# Patient Record
Sex: MALE | Race: OTHER | HISPANIC OR LATINO | URBAN - METROPOLITAN AREA
[De-identification: names, ages, dates, MRNs, and addresses within clinical notes are randomized per-mention and may not be internally consistent; named-entity substitution may affect disease eponyms.]

---

## 2023-04-08 ENCOUNTER — EMERGENCY (EMERGENCY)
Facility: HOSPITAL | Age: 58
LOS: 1 days | Discharge: HOME | End: 2023-04-08
Attending: STUDENT IN AN ORGANIZED HEALTH CARE EDUCATION/TRAINING PROGRAM | Admitting: STUDENT IN AN ORGANIZED HEALTH CARE EDUCATION/TRAINING PROGRAM
Payer: COMMERCIAL

## 2023-04-08 VITALS
SYSTOLIC BLOOD PRESSURE: 162 MMHG | RESPIRATION RATE: 18 BRPM | OXYGEN SATURATION: 100 % | DIASTOLIC BLOOD PRESSURE: 101 MMHG | TEMPERATURE: 98 F | HEART RATE: 93 BPM

## 2023-04-08 VITALS
HEART RATE: 89 BPM | SYSTOLIC BLOOD PRESSURE: 144 MMHG | RESPIRATION RATE: 16 BRPM | DIASTOLIC BLOOD PRESSURE: 98 MMHG | OXYGEN SATURATION: 100 %

## 2023-04-08 LAB
ALBUMIN SERPL ELPH-MCNC: 4.5 G/DL — SIGNIFICANT CHANGE UP (ref 3.3–5)
ALP SERPL-CCNC: 83 U/L — SIGNIFICANT CHANGE UP (ref 40–120)
ALT FLD-CCNC: 51 U/L — HIGH (ref 4–41)
ANION GAP SERPL CALC-SCNC: 15 MMOL/L — HIGH (ref 7–14)
AST SERPL-CCNC: 32 U/L — SIGNIFICANT CHANGE UP (ref 4–40)
BASOPHILS # BLD AUTO: 0.04 K/UL — SIGNIFICANT CHANGE UP (ref 0–0.2)
BASOPHILS NFR BLD AUTO: 0.7 % — SIGNIFICANT CHANGE UP (ref 0–2)
BILIRUB SERPL-MCNC: 0.2 MG/DL — SIGNIFICANT CHANGE UP (ref 0.2–1.2)
BUN SERPL-MCNC: 15 MG/DL — SIGNIFICANT CHANGE UP (ref 7–23)
CALCIUM SERPL-MCNC: 9.5 MG/DL — SIGNIFICANT CHANGE UP (ref 8.4–10.5)
CHLORIDE SERPL-SCNC: 104 MMOL/L — SIGNIFICANT CHANGE UP (ref 98–107)
CO2 SERPL-SCNC: 20 MMOL/L — LOW (ref 22–31)
CREAT SERPL-MCNC: 1.04 MG/DL — SIGNIFICANT CHANGE UP (ref 0.5–1.3)
EGFR: 85 ML/MIN/1.73M2 — SIGNIFICANT CHANGE UP
EOSINOPHIL # BLD AUTO: 0.16 K/UL — SIGNIFICANT CHANGE UP (ref 0–0.5)
EOSINOPHIL NFR BLD AUTO: 3 % — SIGNIFICANT CHANGE UP (ref 0–6)
GLUCOSE SERPL-MCNC: 115 MG/DL — HIGH (ref 70–99)
HCT VFR BLD CALC: 42.1 % — SIGNIFICANT CHANGE UP (ref 39–50)
HGB BLD-MCNC: 14.7 G/DL — SIGNIFICANT CHANGE UP (ref 13–17)
IANC: 2.16 K/UL — SIGNIFICANT CHANGE UP (ref 1.8–7.4)
IMM GRANULOCYTES NFR BLD AUTO: 0.4 % — SIGNIFICANT CHANGE UP (ref 0–0.9)
LYMPHOCYTES # BLD AUTO: 2.45 K/UL — SIGNIFICANT CHANGE UP (ref 1–3.3)
LYMPHOCYTES # BLD AUTO: 45.7 % — HIGH (ref 13–44)
MAGNESIUM SERPL-MCNC: 2.2 MG/DL — SIGNIFICANT CHANGE UP (ref 1.6–2.6)
MCHC RBC-ENTMCNC: 29.3 PG — SIGNIFICANT CHANGE UP (ref 27–34)
MCHC RBC-ENTMCNC: 34.9 GM/DL — SIGNIFICANT CHANGE UP (ref 32–36)
MCV RBC AUTO: 83.9 FL — SIGNIFICANT CHANGE UP (ref 80–100)
MONOCYTES # BLD AUTO: 0.53 K/UL — SIGNIFICANT CHANGE UP (ref 0–0.9)
MONOCYTES NFR BLD AUTO: 9.9 % — SIGNIFICANT CHANGE UP (ref 2–14)
NEUTROPHILS # BLD AUTO: 2.16 K/UL — SIGNIFICANT CHANGE UP (ref 1.8–7.4)
NEUTROPHILS NFR BLD AUTO: 40.3 % — LOW (ref 43–77)
NRBC # BLD: 0 /100 WBCS — SIGNIFICANT CHANGE UP (ref 0–0)
NRBC # FLD: 0 K/UL — SIGNIFICANT CHANGE UP (ref 0–0)
NT-PROBNP SERPL-SCNC: <5 PG/ML — SIGNIFICANT CHANGE UP
PHOSPHATE SERPL-MCNC: 4.4 MG/DL — SIGNIFICANT CHANGE UP (ref 2.5–4.5)
PLATELET # BLD AUTO: 275 K/UL — SIGNIFICANT CHANGE UP (ref 150–400)
POTASSIUM SERPL-MCNC: 4 MMOL/L — SIGNIFICANT CHANGE UP (ref 3.5–5.3)
POTASSIUM SERPL-SCNC: 4 MMOL/L — SIGNIFICANT CHANGE UP (ref 3.5–5.3)
PROT SERPL-MCNC: 7.5 G/DL — SIGNIFICANT CHANGE UP (ref 6–8.3)
RBC # BLD: 5.02 M/UL — SIGNIFICANT CHANGE UP (ref 4.2–5.8)
RBC # FLD: 12.3 % — SIGNIFICANT CHANGE UP (ref 10.3–14.5)
SODIUM SERPL-SCNC: 139 MMOL/L — SIGNIFICANT CHANGE UP (ref 135–145)
TROPONIN T, HIGH SENSITIVITY RESULT: 7 NG/L — SIGNIFICANT CHANGE UP
TROPONIN T, HIGH SENSITIVITY RESULT: 7 NG/L — SIGNIFICANT CHANGE UP
WBC # BLD: 5.36 K/UL — SIGNIFICANT CHANGE UP (ref 3.8–10.5)
WBC # FLD AUTO: 5.36 K/UL — SIGNIFICANT CHANGE UP (ref 3.8–10.5)

## 2023-04-08 PROCEDURE — 71046 X-RAY EXAM CHEST 2 VIEWS: CPT | Mod: 26

## 2023-04-08 PROCEDURE — 99285 EMERGENCY DEPT VISIT HI MDM: CPT

## 2023-04-08 RX ORDER — IPRATROPIUM/ALBUTEROL SULFATE 18-103MCG
3 AEROSOL WITH ADAPTER (GRAM) INHALATION
Refills: 0 | Status: COMPLETED | OUTPATIENT
Start: 2023-04-08 | End: 2023-04-08

## 2023-04-08 RX ADMIN — Medication 3 MILLILITER(S): at 06:10

## 2023-04-08 RX ADMIN — Medication 3 MILLILITER(S): at 03:45

## 2023-04-08 RX ADMIN — Medication 3 MILLILITER(S): at 03:49

## 2023-04-08 RX ADMIN — Medication 50 MILLIGRAM(S): at 06:09

## 2023-04-08 NOTE — ED PROVIDER NOTE - OBJECTIVE STATEMENT
This is a 57 year old male with pmh of asthma presenting with worsening shortness of breath and difficulty breathing for about 2 weeks. Reports that when he came back from a trip to [place], he has been having more frequent coughing, more short of breath and having difficulty walking up the stairs due to the sob. Has been using his rescue inhaler but he reports that he tries not to use it too much to not be dependent on it. Hasn't seen a pulmonologist or cardiologist in a while. Endorses chest tightness substernally associated with coughing. Denies any fever/chills, abdominal pain, urinary sxs.

## 2023-04-08 NOTE — ED PROVIDER NOTE - ATTENDING CONTRIBUTION TO CARE
I have personally performed a face to face medical and diagnostic evaluation of the patient. I have discussed with and reviewed the Resident's note and agree with the History, ROS, Physical Exam and MDM unless otherwise indicated. A brief summary of my personal evaluation and impression can be found below.    Lulu DC: 57-year-old male, with a history of asthma, hyperlipidemia, presents with a chief complaint of shortness of breath, that feels consistent with prior asthma exacerbations associated with substernal chest discomfort, notes has a mild shortness of breath and dyspnea on exertion without chest pain, has a cardiologist, reports had a stress test years ago, no new lower extremity swelling or edema, no fever, has been having a persistent nonbloody intermittently productive cough with no sick contacts at home no fever has been taking his rescue inhaler at home without improvement.  Reports this feels like his asthma had multiple prior hospitalizations as a child for asthma no BiPAP or intubations.   No prior hx of dvt or pe.     All other ROS negative, except as above and as per HPI and ROS section.    VITALS: Initial triage and subsequent vitals have been reviewed by me.  GEN APPEARANCE: Alert, non-toxic, well-appearing, NAD.  HEAD: Atraumatic.  EYES: PERRLa, EOMI, vision grossly intact.   NECK: Supple  CV: RRR, S1S2, no c/r/m/g. Cap refill <2 seconds. No bruits.   LUNGS: CTAB. No abnormal breath sounds.  ABDOMEN: Soft, NTND. No guarding or rebound.   MSK/EXT: No spinal or extremity point tenderness. No CVA ttp. Pelvis stable. No peripheral edema.  NEURO: Alert, follows commands. Weight bearing normal. Speech normal. Sensation and motor normal x4 extremities.   SKIN: Warm, dry and intact. No rash.  PSYCH: Appropriate    Plan/MDM:  Exam vital signs stable nontoxic-appearing physical exam as above, DDx concern for possible asthma exacerbation, he appears to be moving air well with a normal SPO2 but does not appear to be wheezing, however patient reports his symptoms are very consistent with prior asthma exacerbations, given this we will check basic labs EKG chest x-ray cardiac enzymes will give nebs and meds, will reassess and dispo accordingly thereafter.

## 2023-04-08 NOTE — ED PROVIDER NOTE - PHYSICAL EXAMINATION
General: NAD  HEENT: NCAT. Non erythematous, non swollen tonsils. No exudates.  Cardiac: RRR, 2+ radial pulses  Chest: CTA. No wheezing.   Abdomen: soft, non-distended, no ttp, no rebound or guarding  Extremities: no peripheral edema, calf tenderness, or leg size discrepancies  Skin: no rashes  Neuro: AAOx3, motor and sensory grossly intact.   Psych: mood and affect appropriate

## 2023-04-08 NOTE — ED PROVIDER NOTE - PROGRESS NOTE DETAILS
Josep Campa, PGY1 - patient reassessed. Reports feeling much better in terms of his shortness of breath. pending 2nd trop. cxr clear. Will d/c w/ pcp f/u. Lulu DC: Patient reexamined at bedside reports feeling much improved shortness of breath resolved, labs and imaging on actionable 6 comfortable and agreeable with plan for discharge with PMD/cardiology follow-up outpatient strict turn precautions discussed.

## 2023-04-08 NOTE — ED PROVIDER NOTE - NSFOLLOWUPINSTRUCTIONS_ED_ALL_ED_FT
Thank you for visiting our Emergency Department, it has been a pleasure taking part in your healthcare.  Please read and follow all of your discharge instructions. These instructions contain important information regarding your Emergency Department visit and future medical care.     Your discharge diagnosis is: shortness of breath  Please take all discharge medications as indicated below:  Please continue all medications as rx'd by your PMD.  Prednisone 40 mg once a day x5 days  Please follow up with your Primary Care Doctor (PMD) within x48 hours.  Bring and show your PMD all documents and results you were given during your ED visit.  If you do not have a primary care doctor please call [phone] to establish primary care.  A copy of resulted labs, imaging, and findings have been provided to you.   You can also access all of your results through the [facility].  If you have questions about your results, please call the Emergency Department.  During your visit and at time of discharge, you had a detailed discussion with your provider regarding your diagnosis, care management and discharge planning.  Topics that were discussed included but were not limited to: return precautions, follow up visits with existing or new providers, new prescriptions and/or medication changes, wound and/or splint/cast care, incidental laboratory/radiology findings, or other care   aspects specific to your diagnosis and treatment. You have been given the opportunity to have your questions answered. At this time you have been deemed stable and fit for discharge.  Return precautions to the Emergency Department include but are not limited to: unrelenting nausea, vomiting, fever, chills, chest pain, shortness of breath, dizziness, chest or abdominal pain, worsening back pain, syncope, blood in urine or stool, headache that doesn't resolve, numbness or tingling, loss of sensation, loss of motor function, or any other concerning symptoms.    Please bring all ED Documents you were given during your stay to your PMD.   They contain important information for you and your PMD, including incidental lab/radiology findings that your PMD should be aware of.

## 2023-04-08 NOTE — ED ADULT NURSE NOTE - OBJECTIVE STATEMENT
Pt A&Ox4, ambulatory. Pt resp equal and unlabored. Pt denies pmhx. Pt c/o chest tightness, congestion, PAULSON, productive cough x 2 weeks. Denies; bloody sputum, fever/chills, n/v/d, dizziness, HA, urinary symptoms, body aches. 18G to L ac.

## 2023-04-08 NOTE — ED ADULT TRIAGE NOTE - CHIEF COMPLAINT QUOTE
diff breathing    c/o diff breathing, chest tightness and non-productive cough for a few days.  using mdi w/o relief.  pmhx ashtma- no hx of intubations or recent steroid use

## 2023-04-08 NOTE — ED PROVIDER NOTE - PATIENT PORTAL LINK FT
You can access the FollowMyHealth Patient Portal offered by [facility] by registering at the following website: [email or web address]. By joining [facility]’s FollowMyHealth portal, you will also be able to view your health information using other applications (apps) compatible with our system.

## 2023-04-08 NOTE — ED PROVIDER NOTE - NS ED ROS FT
GENERAL: No fever, no chills  	EYES: No change in vision  	HEENT: No trouble swallowing or speaking  	CARDIAC: + chest pain  	PULMONARY: No cough, + SOB  	GI: No abdominal pain, no nausea, no vomiting, no diarrhea, no constipation  	: No changes in urination  	SKIN: No rashes  	NEURO: No headache, no numbness  	MSK: No visible bony deformity   Otherwise as HPI or negative.

## 2023-04-08 NOTE — ED PROVIDER NOTE - CLINICAL SUMMARY MEDICAL DECISION MAKING FREE TEXT BOX
This is a 57 year old male with pmh of asthma presenting with worsening shortness of breath and difficulty breathing for about 2 weeks. Reports that when he came back from a trip to [place], he has been having more frequent coughing, more short of breath and having difficulty walking up the stairs due to the sob. Vitals wnl. Saturating above 98% on room air. No wheezing on exam. Not tachypneic on exam. Will obtain labs, trop, bnp, cxr. Will treat with duoneb. Will reassess. Most likely dc with f/u.